# Patient Record
Sex: FEMALE | Race: BLACK OR AFRICAN AMERICAN | NOT HISPANIC OR LATINO | Employment: STUDENT | URBAN - METROPOLITAN AREA
[De-identification: names, ages, dates, MRNs, and addresses within clinical notes are randomized per-mention and may not be internally consistent; named-entity substitution may affect disease eponyms.]

---

## 2021-02-19 ENCOUNTER — OFFICE VISIT (OUTPATIENT)
Dept: URGENT CARE | Facility: CLINIC | Age: 26
End: 2021-02-19
Payer: COMMERCIAL

## 2021-02-19 VITALS
RESPIRATION RATE: 16 BRPM | TEMPERATURE: 96.2 F | HEIGHT: 63 IN | WEIGHT: 141.6 LBS | SYSTOLIC BLOOD PRESSURE: 118 MMHG | HEART RATE: 82 BPM | DIASTOLIC BLOOD PRESSURE: 78 MMHG | BODY MASS INDEX: 25.09 KG/M2 | OXYGEN SATURATION: 100 %

## 2021-02-19 DIAGNOSIS — H60.543 DERMATITIS OF EAR CANAL, BILATERAL: ICD-10-CM

## 2021-02-19 DIAGNOSIS — H92.02 MASTOID PAIN, LEFT: ICD-10-CM

## 2021-02-19 PROCEDURE — 99203 OFFICE O/P NEW LOW 30 MIN: CPT | Performed by: NURSE PRACTITIONER

## 2021-02-19 RX ORDER — AMOXICILLIN AND CLAVULANATE POTASSIUM 875; 125 MG/1; MG/1
1 TABLET, FILM COATED ORAL 2 TIMES DAILY
Qty: 14 TABLET | Refills: 0 | Status: SHIPPED | OUTPATIENT
Start: 2021-02-19 | End: 2021-02-26

## 2021-02-19 ASSESSMENT — ENCOUNTER SYMPTOMS
COUGH: 0
SORE THROAT: 1

## 2021-02-20 ENCOUNTER — TELEPHONE (OUTPATIENT)
Dept: URGENT CARE | Facility: PHYSICIAN GROUP | Age: 26
End: 2021-02-20

## 2021-02-20 ENCOUNTER — HOSPITAL ENCOUNTER (OUTPATIENT)
Dept: RADIOLOGY | Facility: MEDICAL CENTER | Age: 26
End: 2021-02-20
Attending: NURSE PRACTITIONER
Payer: COMMERCIAL

## 2021-02-20 DIAGNOSIS — H92.02 MASTOID PAIN, LEFT: ICD-10-CM

## 2021-02-20 PROCEDURE — 70480 CT ORBIT/EAR/FOSSA W/O DYE: CPT

## 2021-02-20 RX ORDER — FLUOCINOLONE ACETONIDE 0.11 MG/ML
5 OIL AURICULAR (OTIC) 2 TIMES DAILY
Qty: 20 ML | Refills: 0 | Status: SHIPPED | OUTPATIENT
Start: 2021-02-20 | End: 2021-02-27

## 2021-02-20 RX ORDER — IBUPROFEN 600 MG/1
600 TABLET ORAL EVERY 6 HOURS PRN
Qty: 20 TABLET | Refills: 0 | Status: SHIPPED | OUTPATIENT
Start: 2021-02-20 | End: 2021-02-27

## 2021-02-20 NOTE — PATIENT INSTRUCTIONS
Earache, Adult  An earache, or ear pain, can be caused by many things, including:  · An infection.  · Ear wax buildup.  · Ear pressure.  · Something in the ear that should not be there (foreign body).  · A sore throat.  · Tooth problems.  · Jaw problems.  Treatment of the earache will depend on the cause. If the cause is not clear or cannot be determined, you may need to watch your symptoms until your earache goes away or until a cause is found.  Follow these instructions at home:  Pay attention to any changes in your symptoms. Take these actions to help with your pain:  · Take or apply over-the-counter and prescription medicines only as told by your health care provider.  · If you were prescribed an antibiotic medicine, use it as told by your health care provider. Do not stop using the antibiotic even if you start to feel better.  · Do not put anything in your ear other than medicine that is prescribed by your health care provider.  · If directed, apply heat to the affected area as often as told by your health care provider. Use the heat source that your health care provider recommends, such as a moist heat pack or a heating pad.  ? Place a towel between your skin and the heat source.  ? Leave the heat on for 20-30 minutes.  ? Remove the heat if your skin turns bright red. This is especially important if you are unable to feel pain, heat, or cold. You may have a greater risk of getting burned.  · If directed, put ice on the ear:  ? Put ice in a plastic bag.  ? Place a towel between your skin and the bag.  ? Leave the ice on for 20 minutes, 2-3 times a day.  · Try resting in an upright position instead of lying down. This may help to reduce pressure in your ear and relieve pain.  · Chew gum if it helps to relieve your ear pain.  · Treat any allergies as told by your health care provider.  · Keep all follow-up visits as told by your health care provider. This is important.  Contact a health care provider if:  · Your  pain does not improve within 2 days.  · Your earache gets worse.  · You have new symptoms.  · You have a fever.  Get help right away if:  · You have a severe headache.  · You have a stiff neck.  · You have trouble swallowing.  · You have redness or swelling behind your ear.  · You have fluid or blood coming from your ear.  · You have hearing loss.  · You feel dizzy.  This information is not intended to replace advice given to you by your health care provider. Make sure you discuss any questions you have with your health care provider.  Document Released: 08/04/2005 Document Revised: 11/30/2018 Document Reviewed: 06/12/2017  Elsevier Patient Education © 2020 [employer]

## 2021-02-20 NOTE — PROGRESS NOTES
Subjective:     Alvarez Fountain is a 25 y.o. female who presents for Ear Pain (x2wks, lump behind left ear and pain)      Lump behind left ear for 2 weeks.  Ache in ear canal. Constant ache. Denies nasal congestion or runny nose. Had nausea after nap. Sore throat yesterday, gargled salt water, now resolved. Intermittent ear flakyness over the last year. Can't lay on left side. Denies trauma.  Masks don't bother area.  Saw denitst for TMJ, started using a mouth guard this past week.         Otalgia   There is pain in the left ear. This is a new problem. The current episode started 1 to 4 weeks ago. The problem occurs constantly. The problem has been gradually worsening. There has been no fever. The pain is at a severity of 3/10. The pain is mild. Associated symptoms include a sore throat. Pertinent negatives include no coughing, diarrhea, ear discharge, headaches, hearing loss, rash, rhinorrhea or vomiting. She has tried ear drops for the symptoms. The treatment provided mild relief. There is no history of a chronic ear infection or hearing loss.       Past Medical History:   Diagnosis Date   • ADD (attention deficit disorder with hyperactivity)    • Bipolar 1 disorder (HCC)        History reviewed. No pertinent surgical history.    Social History     Socioeconomic History   • Marital status: Single     Spouse name: Not on file   • Number of children: Not on file   • Years of education: Not on file   • Highest education level: Not on file   Occupational History   • Not on file   Tobacco Use   • Smoking status: Never Smoker   • Smokeless tobacco: Never Used   Substance and Sexual Activity   • Alcohol use: No   • Drug use: No   • Sexual activity: Not on file   Other Topics Concern   • Not on file   Social History Narrative   • Not on file     Social Determinants of Health     Financial Resource Strain:    • Difficulty of Paying Living Expenses:    Food Insecurity:    • Worried About Running Out of Food in the Last  "Year:    • Ran Out of Food in the Last Year:    Transportation Needs:    • Lack of Transportation (Medical):    • Lack of Transportation (Non-Medical):    Physical Activity:    • Days of Exercise per Week:    • Minutes of Exercise per Session:    Stress:    • Feeling of Stress :    Social Connections:    • Frequency of Communication with Friends and Family:    • Frequency of Social Gatherings with Friends and Family:    • Attends Rastafari Services:    • Active Member of Clubs or Organizations:    • Attends Club or Organization Meetings:    • Marital Status:    Intimate Partner Violence:    • Fear of Current or Ex-Partner:    • Emotionally Abused:    • Physically Abused:    • Sexually Abused:         History reviewed. No pertinent family history.     Allergies   Allergen Reactions   • Imitrex [Sumatriptan Succinate]        Review of Systems   Constitutional: Negative for fever.   HENT: Positive for ear pain and sore throat. Negative for congestion, ear discharge, hearing loss, rhinorrhea, sinus pain and tinnitus.    Respiratory: Negative for cough.    Gastrointestinal: Positive for nausea. Negative for diarrhea and vomiting.   Skin: Negative for rash.   Neurological: Negative for sensory change, speech change, focal weakness, weakness and headaches.   All other systems reviewed and are negative.       Objective:   /78 (BP Location: Left arm, Patient Position: Sitting, BP Cuff Size: Adult)   Pulse 82   Temp (!) 35.7 °C (96.2 °F) (Temporal)   Resp 16   Ht 1.6 m (5' 3\")   Wt 64.2 kg (141 lb 9.6 oz)   SpO2 100%   BMI 25.08 kg/m²     Physical Exam  Vitals reviewed.   Constitutional:       General: She is not in acute distress.     Appearance: She is well-developed.   HENT:      Head: Normocephalic and atraumatic.      Right Ear: External ear normal. No laceration, drainage, swelling or tenderness. No middle ear effusion. No mastoid tenderness. No hemotympanum. Tympanic membrane is not injected, perforated, " erythematous, retracted or bulging.      Left Ear: Tenderness present. No laceration or drainage. There is mastoid tenderness. No hemotympanum. Tympanic membrane is not injected, perforated, erythematous, retracted or bulging.      Ears:      Comments: Left mastoid tenderness to palpation. No palpable mass. Left mastoid just slightly more pronounced than right side. No bruising, erythema, rash, or lesions. Cerumen in canals, no occlussion. TM clear.      Nose: Nose normal.      Mouth/Throat:      Lips: Pink.      Mouth: Mucous membranes are moist.      Pharynx: Oropharynx is clear. No oropharyngeal exudate or posterior oropharyngeal erythema.   Eyes:      Conjunctiva/sclera: Conjunctivae normal.   Cardiovascular:      Rate and Rhythm: Normal rate.   Pulmonary:      Effort: Pulmonary effort is normal.      Breath sounds: Normal breath sounds.   Musculoskeletal:         General: Normal range of motion.      Cervical back: Normal range of motion and neck supple. No rigidity or tenderness.   Lymphadenopathy:      Head:      Right side of head: No submental, submandibular, tonsillar, preauricular, posterior auricular or occipital adenopathy.      Left side of head: No submental, submandibular, tonsillar, preauricular, posterior auricular or occipital adenopathy.   Skin:     General: Skin is warm and dry.      Findings: No rash.   Neurological:      General: No focal deficit present.      Mental Status: She is alert and oriented to person, place, and time.      GCS: GCS eye subscore is 4. GCS verbal subscore is 5. GCS motor subscore is 6.   Psychiatric:         Mood and Affect: Mood normal.         Speech: Speech normal.         Behavior: Behavior normal.         Thought Content: Thought content normal.         Judgment: Judgment normal.         Assessment/Plan:   1. Mastoid pain, left  - amoxicillin-clavulanate (AUGMENTIN) 875-125 MG Tab; Take 1 tablet by mouth 2 times a day for 7 days.  Dispense: 14 tablet; Refill: 0  -  CT-POST-FOSSA-EAR W/O; Future  - ibuprofen (MOTRIN) 600 MG Tab; Take 1 tablet by mouth every 6 hours as needed for Moderate Pain or Inflammation for up to 7 days.  Dispense: 20 tablet; Refill: 0    2. Dermatitis of ear canal, bilateral  - Fluocinolone Acetonide (DERMOTIC) 0.01 % Oil; Administer 5 Drops into affected ear(s) 2 Times a Day for 7 days.  Dispense: 20 mL; Refill: 0    Other orders  - IBUPROFEN PO; Take  by mouth.  -Take antibiotic as directed.  -Oral hydration.  -Ibuprofen or tylenol as directed for pain and/or fevers.   -Follow up with primary care provider.    Follow up for failure to improve after 48 to 72 hours of antibiotic therapy, worsening symptoms, persistent fevers, ear drainage, persistent or increased pain, lethargy, persistent or severe  Headache, stiff neck, or any other concerns.    No indication of otitis media. External , no errythema or edema. Discussed imaging to r/o mastoiditis with TTP of area. Initiated oral antibiotics with CT follow up in the morning. Pt afebrile. Discussed possible referred pain to ear from TMJ.    IMPRESSION: No discrete mass is identified. Soft tissue thickening in the subcutaneous fat deep to the marker overlying the left mastoid air cells may be inflammatory in nature.    Differential diagnosis, natural history, supportive care, and indications for immediate follow-up discussed.

## 2021-02-22 PROBLEM — G43.909 MIGRAINE HEADACHE: Status: ACTIVE | Noted: 2021-02-22

## 2021-02-22 ASSESSMENT — ENCOUNTER SYMPTOMS
RHINORRHEA: 0
DIARRHEA: 0
NAUSEA: 1
SPEECH CHANGE: 0
SENSORY CHANGE: 0
FOCAL WEAKNESS: 0
HEADACHES: 0
VOMITING: 0
SINUS PAIN: 0
FEVER: 0
WEAKNESS: 0

## 2021-08-13 ENCOUNTER — OFFICE VISIT (OUTPATIENT)
Dept: URGENT CARE | Facility: CLINIC | Age: 26
End: 2021-08-13
Payer: COMMERCIAL

## 2021-08-13 VITALS
DIASTOLIC BLOOD PRESSURE: 70 MMHG | HEIGHT: 63 IN | RESPIRATION RATE: 14 BRPM | TEMPERATURE: 97.6 F | OXYGEN SATURATION: 100 % | BODY MASS INDEX: 23.21 KG/M2 | SYSTOLIC BLOOD PRESSURE: 114 MMHG | HEART RATE: 93 BPM | WEIGHT: 131 LBS

## 2021-08-13 DIAGNOSIS — L28.2 PRURITIC RASH: ICD-10-CM

## 2021-08-13 PROCEDURE — 99213 OFFICE O/P EST LOW 20 MIN: CPT | Performed by: PHYSICIAN ASSISTANT

## 2021-08-13 RX ORDER — TRIAMCINOLONE ACETONIDE 1 MG/G
CREAM TOPICAL
Qty: 45 G | Refills: 0 | Status: SHIPPED
Start: 2021-08-13 | End: 2021-09-15

## 2021-08-13 ASSESSMENT — ENCOUNTER SYMPTOMS
CHILLS: 0
FEVER: 0

## 2021-08-13 NOTE — PROGRESS NOTES
"  Subjective:   Alvarez Fountain is a 26 y.o. female who presents today with   Chief Complaint   Patient presents with   • Rash     3x weeks, rash on hands, mild itch      Rash  This is a new problem. Episode onset: 3 weeks. The problem is unchanged. The affected locations include the right hand and left hand. The rash is characterized by itchiness and dryness. She was exposed to nothing. Pertinent negatives include no fever. Treatments tried: topical antibiotic ointment. The treatment provided no relief.     Patient states she recently went up on her lamotrigine dose about 4 weeks ago and then came down on the dose as it was giving her side effects.  The side effects have improved but she is unsure if the rash is potentially from that.  PMH:  has a past medical history of ADD (attention deficit disorder with hyperactivity) and Bipolar 1 disorder (HCC).  MEDS:   Current Outpatient Medications:   •  triamcinolone acetonide (KENALOG) 0.1 % Cream, Apply thin layer to affected area twice daily for up to 14 days, Disp: 45 g, Rfl: 0  •  IBUPROFEN PO, Take  by mouth., Disp: , Rfl:   •  lamotrigine (LAMICTAL) 100 MG Tab, Take 100 mg by mouth every day., Disp: , Rfl:   •  buPROPion (WELLBUTRIN) 75 MG Tab, Take 75 mg by mouth 2 times a day., Disp: , Rfl:   ALLERGIES:   Allergies   Allergen Reactions   • Imitrex [Sumatriptan Succinate]    • Sumatriptan      SURGHX: No past surgical history on file.  SOCHX:  reports that she has never smoked. She has never used smokeless tobacco. She reports that she does not drink alcohol and does not use drugs.  FH: Reviewed with patient, not pertinent to this visit.     Review of Systems   Constitutional: Negative for chills and fever.   Skin: Positive for itching and rash.      Objective:   /70 (BP Location: Left arm, Patient Position: Sitting, BP Cuff Size: Adult)   Pulse 93   Temp 36.4 °C (97.6 °F) (Temporal)   Resp 14   Ht 1.6 m (5' 3\")   Wt 59.4 kg (131 lb)   SpO2 100%   BMI " 23.21 kg/m²   Physical Exam  Vitals and nursing note reviewed.   Constitutional:       General: She is not in acute distress.     Appearance: Normal appearance. She is well-developed. She is not ill-appearing or toxic-appearing.   HENT:      Head: Normocephalic and atraumatic.      Right Ear: Hearing normal.      Left Ear: Hearing normal.   Eyes:      Pupils: Pupils are equal, round, and reactive to light.   Cardiovascular:      Rate and Rhythm: Normal rate.   Pulmonary:      Effort: Pulmonary effort is normal.   Musculoskeletal:      Comments: Normal movement in all 4 extremities   Skin:     General: Skin is warm and dry.             Comments: Dry eczematous, erythematous macular rash to the dorsum of the hands bilaterally.   Neurological:      Mental Status: She is alert.      Coordination: Coordination normal.   Psychiatric:         Mood and Affect: Mood normal.       Assessment/Plan:   Assessment    1. Pruritic rash  - REFERRAL TO DERMATOLOGY  - triamcinolone acetonide (KENALOG) 0.1 % Cream; Apply thin layer to affected area twice daily for up to 14 days  Dispense: 45 g; Refill: 0  Most consistent with eczema or contact dermatitis and given no diffuse rash likely not related to her lamotrigine but she will monitor and follow-up with her psychiatrist.  Differential diagnosis, natural history, supportive care, and indications for immediate follow-up discussed.   Patient given instructions and understanding of medications and treatment.    If not improving in 3-5 days, F/U with PCP or return to  if symptoms worsen.    Patient agreeable to plan.  Greater than 20 minutes were spent reviewing patient's chart, examining and obtaining history from patient, and discussing plan of care.       Please note that this dictation was created using voice recognition software. I have made every reasonable attempt to correct obvious errors, but I expect that there are errors of grammar and possibly content that I did not  discover before finalizing the note.    Jose Fountain PA-C

## 2021-09-13 ENCOUNTER — TELEPHONE (OUTPATIENT)
Dept: SCHEDULING | Facility: IMAGING CENTER | Age: 26
End: 2021-09-13

## 2021-09-15 ENCOUNTER — OFFICE VISIT (OUTPATIENT)
Dept: MEDICAL GROUP | Facility: PHYSICIAN GROUP | Age: 26
End: 2021-09-15
Payer: COMMERCIAL

## 2021-09-15 VITALS
HEIGHT: 64 IN | SYSTOLIC BLOOD PRESSURE: 108 MMHG | TEMPERATURE: 98.2 F | WEIGHT: 126.5 LBS | DIASTOLIC BLOOD PRESSURE: 60 MMHG | HEART RATE: 102 BPM | OXYGEN SATURATION: 97 % | BODY MASS INDEX: 21.6 KG/M2

## 2021-09-15 DIAGNOSIS — R53.82 CHRONIC FATIGUE: ICD-10-CM

## 2021-09-15 DIAGNOSIS — R42 DIZZINESS: ICD-10-CM

## 2021-09-15 DIAGNOSIS — F31.32 BIPOLAR AFFECTIVE DISORDER, CURRENTLY DEPRESSED, MODERATE (HCC): ICD-10-CM

## 2021-09-15 DIAGNOSIS — Z00.00 HEALTH CARE MAINTENANCE: ICD-10-CM

## 2021-09-15 DIAGNOSIS — Z13.21 ENCOUNTER FOR VITAMIN DEFICIENCY SCREENING: ICD-10-CM

## 2021-09-15 DIAGNOSIS — F33.41 RECURRENT MAJOR DEPRESSIVE DISORDER, IN PARTIAL REMISSION (HCC): ICD-10-CM

## 2021-09-15 DIAGNOSIS — F41.1 GAD (GENERALIZED ANXIETY DISORDER): ICD-10-CM

## 2021-09-15 DIAGNOSIS — Z13.1 DIABETES MELLITUS SCREENING: ICD-10-CM

## 2021-09-15 DIAGNOSIS — F90.2 ATTENTION DEFICIT HYPERACTIVITY DISORDER (ADHD), COMBINED TYPE: ICD-10-CM

## 2021-09-15 DIAGNOSIS — Z13.220 LIPID SCREENING: ICD-10-CM

## 2021-09-15 PROCEDURE — 99215 OFFICE O/P EST HI 40 MIN: CPT | Performed by: INTERNAL MEDICINE

## 2021-09-15 RX ORDER — FLUOXETINE 10 MG/1
CAPSULE ORAL
COMMUNITY
End: 2021-09-15

## 2021-09-15 RX ORDER — LAMOTRIGINE 150 MG/1
TABLET ORAL
COMMUNITY
Start: 2021-09-03 | End: 2021-09-15

## 2021-09-15 RX ORDER — METHYLPHENIDATE HYDROCHLORIDE 36 MG/1
TABLET ORAL
COMMUNITY
Start: 2021-08-08 | End: 2021-09-15

## 2021-09-15 RX ORDER — METHYLPHENIDATE HYDROCHLORIDE 54 MG/1
TABLET ORAL
COMMUNITY
Start: 2021-09-07

## 2021-09-15 RX ORDER — FLUOXETINE HYDROCHLORIDE 20 MG/1
CAPSULE ORAL
COMMUNITY
Start: 2021-09-02 | End: 2021-10-26

## 2021-09-15 RX ORDER — GABAPENTIN 300 MG/1
CAPSULE ORAL
COMMUNITY
End: 2021-09-15

## 2021-09-15 NOTE — LETTER
[facility]  Fabián Lockwood M.D.  [address]  [facility] [address]  Fax: [phone]   Authorization for Release/Disclosure of   Protected Health Information   Name: ALVAREZ VALERIO : 1995 SSN: [id number]   Address: [address] [place] [id number] Phone:    [phone] (work)   I authorize the entity listed below to release/disclose the PHI below to:   [facility]/Fabián Lockwood M.D. and Fabián Lockwood M.D.   Provider or Entity Name:     Address   City, State, Zip   Phone:      Fax:     Reason for request: continuity of care   Information to be released:    [  ] LAST COLONOSCOPY,  including any PATH REPORT and follow-up  [  ] LAST FIT/COLOGUARD RESULT [  ] LAST DEXA  [  ] LAST MAMMOGRAM  [  ] LAST PAP  [  ] LAST LABS [  ] RETINA EXAM REPORT  [  ] IMMUNIZATION RECORDS  [ x ] last office visit/ Release all info      [  ] Check here and initial the line next to each item to release ALL health information INCLUDING  _____ Care and treatment for drug and / or alcohol abuse  _____ HIV testing, infection status, or AIDS  _____ Genetic Testing    DATES OF SERVICE OR TIME PERIOD TO BE DISCLOSED: _____________  I understand and acknowledge that:  * This Authorization may be revoked at any time by you in writing, except if your health information has already been used or disclosed.  * Your health information that will be used or disclosed as a result of you signing this authorization could be re-disclosed by the recipient. If this occurs, your re-disclosed health information may no longer be protected by State or Federal laws.  * You may refuse to sign this Authorization. Your refusal will not affect your ability to obtain treatment.  * This Authorization becomes effective upon signing and will  on (date) __________.      If no date is indicated, this Authorization will  one (1) year from the signature date.    Name: Alvraez Valerio    Signature:   Date:     9/15/2021       PLEASE  FAX REQUESTED RECORDS BACK TO: [phone]

## 2021-09-15 NOTE — PROGRESS NOTES
New Patient to establish    Chief Complaint   Patient presents with   • Establish Care     bilateral tingle, pain, sob, chest, headache, has been experiencing stress and has a hx of anxiety   • Nausea   • Back Pain     right shoulder       Subjective:     History of Present Illness: Patient is a 26 y.o. female who is here today to establish primary care    1. Attention deficit hyperactivity disorder (ADHD), combined type  2. Recurrent major depressive disorder, in partial remission (HCC)  3. Bipolar affective disorder, currently depressed, moderate (HCC)  4. SARABJIT (generalized anxiety disorder)  Dizziness  Chronic fatigue  >> Patient has above diagnosis for a while, reported episodic multisymptom of different system of the body including fatigue, dizziness, sometimes feel vertigo, tingling of the face, muscle tightness, tiredness    -Reported stress especially from his college study, reported is currently following up with the psychiatrist from [facility], also she counselor here in [place]    Current Outpatient Medications on File Prior to Visit   Medication Sig Dispense Refill   • FLUoxetine (PROZAC) 20 MG Cap      • methylphenidate (CONCERTA) 54 MG CR tablet      • IBUPROFEN PO Take  by mouth.     • lamotrigine (LAMICTAL) 100 MG Tab Take 100 mg by mouth 2 times a day.       No current facility-administered medications on file prior to visit.     Allergies   Allergen Reactions   • Imitrex [Sumatriptan Succinate]    • Sumatriptan      Patient Active Problem List    Diagnosis Date Noted   • SARABJIT (generalized anxiety disorder) 09/15/2021   • Migraine headache 02/22/2021   • Depression, major 08/14/2015   • ADHD (attention deficit hyperactivity disorder) 08/14/2015   • Headaches due to old head injury 12/18/2012   • MEMORY CHANGES 12/18/2012   • Bipolar disorder (HCC) 12/18/2012     Past Medical History:   Diagnosis Date   • ADD (attention deficit disorder with hyperactivity)    • Anxiety    • Attention and  "concentration deficit 12/18/2012     IMO load March 2020   • Bipolar 1 disorder (HCC)    • Depression      History reviewed. No pertinent surgical history.  Family History   Problem Relation Age of Onset   • Fibromyalgia Mother    • No Known Problems Father    • Hypertension Maternal Grandmother    • Diabetes Maternal Grandmother      Social History     Tobacco Use   • Smoking status: Never Smoker   • Smokeless tobacco: Never Used   Vaping Use   • Vaping Use: Never used   Substance Use Topics   • Alcohol use: Not Currently   • Drug use: Never     ROS:     - Constitutional: Negative for fever, chills,    - Eye: Negative for blurry vision    -ENT: Negative for ear pain    - Respiratory: Negative for cough, hemoptysis    - Cardiovascular: Negative for chest pain     - Gastrointestinal: Negative for abdominal pain    - Genitourinary: Negative for dysuria    - Musculoskeletal: Negative for joint swelling    - Skin: Negative for itching    - Neurological: Negative for focal weakness     - Psychiatric/Behavioral: Stable for depression        Physical Exam:     /60 (BP Location: Left arm, Patient Position: Sitting, BP Cuff Size: Adult)   Pulse (!) 102   Temp 36.8 °C (98.2 °F) (Temporal)   Ht 1.626 m (5' 4\")   Wt 57.4 kg (126 lb 8 oz)   SpO2 97%   BMI 21.71 kg/m²   General: Normal appearing. No distress.  ENT: oropharynx without exudates.    Eyes: conjunctiva clear lids without ptosis  Pulmonary: Clear to ausculation.  Normal effort.   Cardiovascular: Regular rate and rhythm  Abdomen: Soft, nontender,  Lymph: No cervical or supraclavicular palpable lymph nodes  Psych: Normal mood and affect.   Offerman-Hallpike maneuver was inconclusive  Assessment and Plan:     1. Attention deficit hyperactivity disorder (ADHD), combined type  2. Recurrent major depressive disorder, in partial remission (HCC)  3. Bipolar affective disorder, currently depressed, moderate (HCC)  4. SARABJIT (generalized anxiety disorder)  Dizziness  Chronic " fatigue  - CBC WITH DIFFERENTIAL; Future  - Comp Metabolic Panel; Future  - CRP HIGH SENSITIVE (CARDIAC); Future  - FREE THYROXINE; Future  - MAGNESIUM; Future  - TSH; Future  - THYROID PEROXIDASE  (TPO) AB; Future  - T3 FREE; Future  - VITAMIN B1; Future  - VITAMIN B6; Future  -ACTH stimulation test  -POCT EKG>> sinus rhythm, normal duration, no axis deviation, no ST-T wave changes,    5. Encounter for vitamin deficiency screening  - VITAMIN D,25 HYDROXY; Future  - VITAMIN B12; Future    6. Health care maintenance  7. Diabetes mellitus screening  - HEMOGLOBIN A1C; Future  - INSULIN FASTING; Future    8. Lipid screening  - LipoFit by NMR; Future    Follow Up:      Return in about 4 weeks (around 10/13/2021).  Labs  Please note that this dictation was created using voice recognition software. I have made every reasonable attempt to correct obvious errors, but I expect that there are errors of grammar and possibly content that I did not discover before finalizing the note.    Signed by: Fabián Lockwood M.D.

## 2021-10-01 LAB
25(OH)D3+25(OH)D2 SERPL-MCNC: 19.4 NG/ML (ref 30–100)
ALBUMIN SERPL-MCNC: 4.6 G/DL (ref 3.9–5)
ALBUMIN/GLOB SERPL: 1.7 {RATIO} (ref 1.2–2.2)
ALP SERPL-CCNC: 45 IU/L (ref 44–121)
ALT SERPL-CCNC: 18 IU/L (ref 0–32)
AST SERPL-CCNC: 15 IU/L (ref 0–40)
BASOPHILS # BLD AUTO: 0 X10E3/UL (ref 0–0.2)
BASOPHILS NFR BLD AUTO: 1 %
BILIRUB SERPL-MCNC: 0.3 MG/DL (ref 0–1.2)
BUN SERPL-MCNC: 9 MG/DL (ref 6–20)
BUN/CREAT SERPL: 12 (ref 9–23)
CALCIUM SERPL-MCNC: 9.7 MG/DL (ref 8.7–10.2)
CHLORIDE SERPL-SCNC: 106 MMOL/L (ref 96–106)
CHOLEST SERPL-MCNC: 163 MG/DL (ref 100–199)
CO2 SERPL-SCNC: 22 MMOL/L (ref 20–29)
CREAT SERPL-MCNC: 0.76 MG/DL (ref 0.57–1)
CRP SERPL-MCNC: <1 MG/L (ref 0–10)
EOSINOPHIL # BLD AUTO: 0.2 X10E3/UL (ref 0–0.4)
EOSINOPHIL NFR BLD AUTO: 3 %
ERYTHROCYTE [DISTWIDTH] IN BLOOD BY AUTOMATED COUNT: 13.1 % (ref 11.7–15.4)
GLOBULIN SER CALC-MCNC: 2.7 G/DL (ref 1.5–4.5)
GLUCOSE SERPL-MCNC: 85 MG/DL (ref 65–99)
HBA1C MFR BLD: 5.8 % (ref 4.8–5.6)
HCT VFR BLD AUTO: 43.7 % (ref 34–46.6)
HDL SERPL-SCNC: 32.8 UMOL/L
HDLC SERPL-MCNC: 74 MG/DL
HGB BLD-MCNC: 14.2 G/DL (ref 11.1–15.9)
IMM GRANULOCYTES # BLD AUTO: 0 X10E3/UL (ref 0–0.1)
IMM GRANULOCYTES NFR BLD AUTO: 0 %
IMMATURE CELLS  115398: NORMAL
INSULIN SERPL-ACNC: 11.8 UIU/ML (ref 2.6–24.9)
LDL SERPL QN: 21.2 NM
LDL SERPL-SCNC: 651 NMOL/L
LDL SMALL SERPL-SCNC: 154 NMOL/L
LDLC SERPL CALC-MCNC: 76 MG/DL (ref 0–99)
LYMPHOCYTES # BLD AUTO: 1.3 X10E3/UL (ref 0.7–3.1)
LYMPHOCYTES NFR BLD AUTO: 27 %
MAGNESIUM SERPL-MCNC: 1.9 MG/DL (ref 1.6–2.3)
MCH RBC QN AUTO: 29.6 PG (ref 26.6–33)
MCHC RBC AUTO-ENTMCNC: 32.5 G/DL (ref 31.5–35.7)
MCV RBC AUTO: 91 FL (ref 79–97)
MONOCYTES # BLD AUTO: 0.3 X10E3/UL (ref 0.1–0.9)
MONOCYTES NFR BLD AUTO: 7 %
MORPHOLOGY BLD-IMP: NORMAL
NEUTROPHILS # BLD AUTO: 3 X10E3/UL (ref 1.4–7)
NEUTROPHILS NFR BLD AUTO: 62 %
NRBC BLD AUTO-RTO: NORMAL %
PLATELET # BLD AUTO: 260 X10E3/UL (ref 150–450)
POTASSIUM SERPL-SCNC: 4.4 MMOL/L (ref 3.5–5.2)
PROT SERPL-MCNC: 7.3 G/DL (ref 6–8.5)
RBC # BLD AUTO: 4.79 X10E6/UL (ref 3.77–5.28)
SODIUM SERPL-SCNC: 142 MMOL/L (ref 134–144)
T3FREE SERPL-MCNC: 2.6 PG/ML (ref 2–4.4)
T4 FREE SERPL-MCNC: 1.13 NG/DL (ref 0.82–1.77)
THYROPEROXIDASE AB SERPL-ACNC: 130 IU/ML (ref 0–34)
TRIGL SERPL-MCNC: 66 MG/DL (ref 0–149)
TSH SERPL DL<=0.005 MIU/L-ACNC: 1.15 UIU/ML (ref 0.45–4.5)
VIT B1 BLD-SCNC: 102.6 NMOL/L (ref 66.5–200)
VIT B12 SERPL-MCNC: 691 PG/ML (ref 232–1245)
VIT B6 SERPL-MCNC: 13.5 UG/L (ref 2–32.8)
WBC # BLD AUTO: 4.9 X10E3/UL (ref 3.4–10.8)

## 2021-10-26 ENCOUNTER — OFFICE VISIT (OUTPATIENT)
Dept: MEDICAL GROUP | Facility: PHYSICIAN GROUP | Age: 26
End: 2021-10-26
Payer: COMMERCIAL

## 2021-10-26 VITALS
SYSTOLIC BLOOD PRESSURE: 96 MMHG | HEIGHT: 64 IN | BODY MASS INDEX: 22.36 KG/M2 | HEART RATE: 90 BPM | DIASTOLIC BLOOD PRESSURE: 60 MMHG | TEMPERATURE: 97.1 F | WEIGHT: 131 LBS | OXYGEN SATURATION: 96 %

## 2021-10-26 DIAGNOSIS — R76.8 THYROID ANTIBODY POSITIVE: ICD-10-CM

## 2021-10-26 DIAGNOSIS — R73.03 PREDIABETES: ICD-10-CM

## 2021-10-26 DIAGNOSIS — E55.9 VITAMIN D DEFICIENCY: ICD-10-CM

## 2021-10-26 PROCEDURE — 99214 OFFICE O/P EST MOD 30 MIN: CPT | Performed by: INTERNAL MEDICINE

## 2021-10-26 RX ORDER — QUETIAPINE FUMARATE 100 MG/1
TABLET, FILM COATED ORAL
COMMUNITY
Start: 2021-10-12

## 2021-10-26 RX ORDER — QUETIAPINE FUMARATE 50 MG/1
TABLET, FILM COATED ORAL
COMMUNITY
Start: 2021-09-30 | End: 2021-10-26

## 2021-10-26 RX ORDER — LAMOTRIGINE 150 MG/1
TABLET ORAL
COMMUNITY
Start: 2021-10-12

## 2021-10-26 RX ORDER — CHOLECALCIFEROL (VITAMIN D3) 125 MCG
5000 CAPSULE ORAL DAILY
Qty: 90 CAPSULE | Refills: 1 | Status: SHIPPED | OUTPATIENT
Start: 2021-10-26

## 2021-10-26 ASSESSMENT — FIBROSIS 4 INDEX: FIB4 SCORE: 0.33

## 2021-10-26 NOTE — PROGRESS NOTES
"Established Patient    Chief Complaint   Patient presents with   • Follow-Up     follow up lab review        Subjective:     HPI:   Alvarez presents today with the following.    Patient had lab with normal liver and kidney function, she has some prediabetes range of A1c, vitamin D is also low, she is taking some supplement including vitamin D however not sure how much is it, also she has some thyroid antibodies, denied having deisi symptoms for thyroid issues, however she has some fatigue and dizziness with low blood pressure numbers sometimes    Patient Active Problem List    Diagnosis Date Noted   • Prediabetes 10/26/2021   • Vitamin D deficiency 10/26/2021   • Thyroid antibody positive 10/26/2021   • SARABJIT (generalized anxiety disorder) 09/15/2021   • Dizziness 09/15/2021   • Chronic fatigue 09/15/2021   • Migraine headache 02/22/2021   • Depression, major 08/14/2015   • ADHD (attention deficit hyperactivity disorder) 08/14/2015   • Headaches due to old head injury 12/18/2012   • MEMORY CHANGES 12/18/2012   • Bipolar disorder (HCC) 12/18/2012       Current Outpatient Medications on File Prior to Visit   Medication Sig Dispense Refill   • QUEtiapine (SEROQUEL) 100 MG Tab      • lamotrigine (LAMICTAL) 150 MG tablet      • methylphenidate (CONCERTA) 54 MG CR tablet      • IBUPROFEN PO Take  by mouth.       No current facility-administered medications on file prior to visit.       Allergies, past medical history, past surgical history, family history, social history reviewed and updated    ROS:  All other systems reviewed and are negative except as stated in the HPI       Physical Exam:     BP (!) 96/60 (BP Location: Right arm, Patient Position: Sitting, BP Cuff Size: Adult)   Pulse 90   Temp 36.2 °C (97.1 °F)   Ht 1.626 m (5' 4\")   Wt 59.4 kg (131 lb)   SpO2 96%   BMI 22.49 kg/m²   General: Normal appearing. No distress.  ENT: oropharynx without exudates.    Eyes: conjunctiva clear lids without " ptosis  Pulmonary: Clear to ausculation.  Normal effort.   Cardiovascular: Regular rate and rhythm  Abdomen: Soft, nontender,  Lymph: No cervical or supraclavicular palpable lymph nodes  Psych: Normal mood and affect.     I have reviewed pertinent labs and diagnostic tests associated with this visit with specific comments listed under the assessment and plan below      Assessment and Plan:     26 y.o. female with the following issues.    1. Prediabetes  2. Vitamin D deficiency  3. Thyroid antibody positive  >> Possibly some contribution from her psychiatry medication including Seroquel regarding dysregulation of blood sugar, advised vitamin D supplement    -discussion regarding healthy dietary options including plenty of vegetable, avoid sugars, regular exercise as tolerated, healthy fat/protein/carbs  - Shown pictures regarding healthy lifestyle changes and healthy dietary options  -Patient would like to start these lifestyle changes to improve obesity/overweight as well as other chronic conditions  -Advised ACTH stimulation test as well  Follow Up:      Return in about 6 weeks (around 12/7/2021).    Please note that this dictation was created using voice recognition software. I have made every reasonable attempt to correct obvious errors, but I expect that there are errors of grammar and possibly content that I did not discover before finalizing the note.    Signed by: Fabián Lockwood M.D.     room air

## 2021-12-07 ENCOUNTER — OFFICE VISIT (OUTPATIENT)
Dept: MEDICAL GROUP | Facility: PHYSICIAN GROUP | Age: 26
End: 2021-12-07
Payer: COMMERCIAL

## 2021-12-07 VITALS
BODY MASS INDEX: 22.53 KG/M2 | HEIGHT: 64 IN | TEMPERATURE: 98.4 F | DIASTOLIC BLOOD PRESSURE: 78 MMHG | WEIGHT: 132 LBS | HEART RATE: 95 BPM | OXYGEN SATURATION: 98 % | SYSTOLIC BLOOD PRESSURE: 96 MMHG

## 2021-12-07 DIAGNOSIS — E55.9 VITAMIN D DEFICIENCY: ICD-10-CM

## 2021-12-07 DIAGNOSIS — M54.6 ACUTE RIGHT-SIDED THORACIC BACK PAIN: ICD-10-CM

## 2021-12-07 DIAGNOSIS — R53.82 CHRONIC FATIGUE: ICD-10-CM

## 2021-12-07 PROCEDURE — 99214 OFFICE O/P EST MOD 30 MIN: CPT | Performed by: INTERNAL MEDICINE

## 2021-12-07 ASSESSMENT — FIBROSIS 4 INDEX: FIB4 SCORE: 0.33

## 2021-12-07 NOTE — PATIENT INSTRUCTIONS
Hot tub with Epsom salt or apple cider vingar 4 cups,  massage with essential oils, application of ice pack OR heating pads with alternating, acupuncture, physical therapy, magnesium glycinate 400 mg daily, Turmeric, mali, Boswellia, black pepper combination supplement , Voltaren gel over-the-counter,

## 2021-12-07 NOTE — PROGRESS NOTES
"Established Patient    Chief Complaint   Patient presents with   • Follow-Up     pre-diabetes, thyroid       Subjective:     HPI:   Alvarez presents today with the following.    3. Acute right-sided thoracic back pain  Reported upper back pain, right side, below the shoulder blade, this is going on for few weeks, aggravated by standing and washing dishes, relieved by rest, patient denied having alarm back pain signs or symptoms    Patient Active Problem List    Diagnosis Date Noted   • Acute right-sided thoracic back pain 12/07/2021   • Prediabetes 10/26/2021   • Vitamin D deficiency 10/26/2021   • Thyroid antibody positive 10/26/2021   • SARABJIT (generalized anxiety disorder) 09/15/2021   • Dizziness 09/15/2021   • Chronic fatigue 09/15/2021   • Migraine headache 02/22/2021   • Depression, major 08/14/2015   • ADHD (attention deficit hyperactivity disorder) 08/14/2015   • Headaches due to old head injury 12/18/2012   • MEMORY CHANGES 12/18/2012   • Bipolar disorder (HCC) 12/18/2012       Current Outpatient Medications on File Prior to Visit   Medication Sig Dispense Refill   • QUEtiapine (SEROQUEL) 100 MG Tab      • lamotrigine (LAMICTAL) 150 MG tablet      • cholecalciferol (D-3-5) 5000 UNIT Cap Take 1 Capsule by mouth every day. 90 Capsule 1   • methylphenidate (CONCERTA) 54 MG CR tablet 36 mg     • IBUPROFEN PO Take  by mouth.       No current facility-administered medications on file prior to visit.       Allergies, past medical history, past surgical history, family history, social history reviewed and updated    ROS:  All other systems reviewed and are negative except as stated in the HPI       Physical Exam:     BP (!) 96/78 (BP Location: Right arm, Patient Position: Sitting, BP Cuff Size: Adult)   Pulse 95   Temp 36.9 °C (98.4 °F) (Temporal)   Ht 1.626 m (5' 4\")   Wt 59.9 kg (132 lb)   SpO2 98%   BMI 22.66 kg/m²   General: Normal appearing. No distress.  Pulmonary: Clear to ausculation.  Normal effort. "   Cardiovascular: Regular rate and rhythm    Assessment and Plan:     26 y.o. female with the following issues.    1. Chronic fatigue  2. Vitamin D deficiency  Continue vitamin D supplement, reported her fatigue is improving    3. Acute right-sided thoracic back pain  Likely musculoskeletal, educated in detail regarding conservative management as well as physical therapy    Follow Up:      Return in about 2 months (around 2/7/2022).    Please note that this dictation was created using voice recognition software. I have made every reasonable attempt to correct obvious errors, but I expect that there are errors of grammar and possibly content that I did not discover before finalizing the note.    Signed by: Fabián Lockwood M.D.

## 2023-05-26 ENCOUNTER — PATIENT MESSAGE (OUTPATIENT)
Dept: HEALTH INFORMATION MANAGEMENT | Facility: OTHER | Age: 28
End: 2023-05-26

## 2023-05-26 ENCOUNTER — DOCUMENTATION (OUTPATIENT)
Dept: HEALTH INFORMATION MANAGEMENT | Facility: OTHER | Age: 28
End: 2023-05-26

## 2023-08-02 ENCOUNTER — TELEPHONE (OUTPATIENT)
Dept: HEALTH INFORMATION MANAGEMENT | Facility: OTHER | Age: 28
End: 2023-08-02